# Patient Record
Sex: MALE | Race: WHITE | NOT HISPANIC OR LATINO | Employment: OTHER | URBAN - NONMETROPOLITAN AREA
[De-identification: names, ages, dates, MRNs, and addresses within clinical notes are randomized per-mention and may not be internally consistent; named-entity substitution may affect disease eponyms.]

---

## 2023-10-20 ENCOUNTER — HOSPITAL ENCOUNTER (EMERGENCY)
Facility: HOSPITAL | Age: 88
Discharge: HOME | End: 2023-10-20
Attending: EMERGENCY MEDICINE
Payer: MEDICARE

## 2023-10-20 ENCOUNTER — APPOINTMENT (OUTPATIENT)
Dept: RADIOLOGY | Facility: HOSPITAL | Age: 88
End: 2023-10-20
Payer: MEDICARE

## 2023-10-20 VITALS
OXYGEN SATURATION: 94 % | HEIGHT: 70 IN | HEART RATE: 65 BPM | SYSTOLIC BLOOD PRESSURE: 151 MMHG | BODY MASS INDEX: 32.54 KG/M2 | RESPIRATION RATE: 18 BRPM | WEIGHT: 227.29 LBS | DIASTOLIC BLOOD PRESSURE: 77 MMHG | TEMPERATURE: 97.2 F
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
## 2023-10-20 DIAGNOSIS — S42.114A CLOSED NONDISPLACED FRACTURE OF BODY OF RIGHT SCAPULA, INITIAL ENCOUNTER: ICD-10-CM

## 2023-10-20 DIAGNOSIS — S09.90XA CLOSED HEAD INJURY, INITIAL ENCOUNTER: Primary | ICD-10-CM

## 2023-10-20 PROCEDURE — 72125 CT NECK SPINE W/O DYE: CPT | Performed by: RADIOLOGY

## 2023-10-20 PROCEDURE — 71260 CT THORAX DX C+: CPT | Performed by: RADIOLOGY

## 2023-10-20 PROCEDURE — 2550000001 HC RX 255 CONTRASTS: Performed by: EMERGENCY MEDICINE

## 2023-10-20 PROCEDURE — 72125 CT NECK SPINE W/O DYE: CPT | Mod: MG

## 2023-10-20 PROCEDURE — 70450 CT HEAD/BRAIN W/O DYE: CPT | Mod: MG

## 2023-10-20 PROCEDURE — 70450 CT HEAD/BRAIN W/O DYE: CPT | Performed by: RADIOLOGY

## 2023-10-20 PROCEDURE — 99285 EMERGENCY DEPT VISIT HI MDM: CPT | Mod: 25 | Performed by: EMERGENCY MEDICINE

## 2023-10-20 PROCEDURE — 74177 CT ABD & PELVIS W/CONTRAST: CPT | Performed by: RADIOLOGY

## 2023-10-20 PROCEDURE — 74177 CT ABD & PELVIS W/CONTRAST: CPT | Mod: MG

## 2023-10-20 RX ORDER — FINASTERIDE 5 MG/1
5 TABLET, FILM COATED ORAL DAILY
COMMUNITY

## 2023-10-20 RX ORDER — TAMSULOSIN HYDROCHLORIDE 0.4 MG/1
0.4 CAPSULE ORAL
COMMUNITY

## 2023-10-20 RX ORDER — OMEPRAZOLE 40 MG/1
40 CAPSULE, DELAYED RELEASE ORAL
COMMUNITY

## 2023-10-20 RX ORDER — ESCITALOPRAM OXALATE 20 MG/1
20 TABLET ORAL DAILY
COMMUNITY

## 2023-10-20 RX ORDER — TRIMETHOPRIM 100 MG/1
100 TABLET ORAL DAILY
COMMUNITY

## 2023-10-20 RX ORDER — TRAZODONE HYDROCHLORIDE 50 MG/1
50 TABLET ORAL NIGHTLY
COMMUNITY

## 2023-10-20 RX ORDER — TORSEMIDE 20 MG/1
20 TABLET ORAL DAILY
COMMUNITY

## 2023-10-20 RX ORDER — CALCIUM CARBONATE 300MG(750)
400 TABLET,CHEWABLE ORAL DAILY
COMMUNITY

## 2023-10-20 RX ORDER — FLUTICASONE FUROATE, UMECLIDINIUM BROMIDE AND VILANTEROL TRIFENATATE 100; 62.5; 25 UG/1; UG/1; UG/1
POWDER RESPIRATORY (INHALATION)
COMMUNITY

## 2023-10-20 RX ORDER — MEMANTINE HYDROCHLORIDE 10 MG/1
10 TABLET ORAL 2 TIMES DAILY
COMMUNITY

## 2023-10-20 RX ORDER — CARBIDOPA AND LEVODOPA 25; 250 MG/1; MG/1
1 TABLET ORAL 3 TIMES DAILY
COMMUNITY

## 2023-10-20 RX ORDER — DIVALPROEX SODIUM 250 MG/1
250 TABLET, FILM COATED, EXTENDED RELEASE ORAL 2 TIMES DAILY
COMMUNITY

## 2023-10-20 RX ORDER — EZETIMIBE 10 MG/1
10 TABLET ORAL DAILY
COMMUNITY

## 2023-10-20 RX ORDER — DONEPEZIL HYDROCHLORIDE 10 MG/1
10 TABLET, ORALLY DISINTEGRATING ORAL NIGHTLY
COMMUNITY

## 2023-10-20 RX ADMIN — IOHEXOL 75 ML: 350 INJECTION, SOLUTION INTRAVENOUS at 12:22

## 2023-10-20 ASSESSMENT — PAIN SCALES - PAIN ASSESSMENT IN ADVANCED DEMENTIA (PAINAD)
FACIALEXPRESSION: SAD, FRIGHTENED, FROWN
BODYLANGUAGE: TENSE, DISTRESSED PACING, FIDGETING
CONSOLABILITY: NO NEED TO CONSOLE
BREATHING: NORMAL
NEGVOCALIZATION: OCCASIONAL MOAN/GROAN, LOW SPEECH, NEGATIVE/DISAPPROVING QUALITY
TOTALSCORE: 3

## 2023-10-20 ASSESSMENT — PAIN - FUNCTIONAL ASSESSMENT: PAIN_FUNCTIONAL_ASSESSMENT: PAINAD (PAIN ASSESSMENT IN ADVANCED DEMENTIA SCALE)

## 2023-10-20 ASSESSMENT — COLUMBIA-SUICIDE SEVERITY RATING SCALE - C-SSRS
6. HAVE YOU EVER DONE ANYTHING, STARTED TO DO ANYTHING, OR PREPARED TO DO ANYTHING TO END YOUR LIFE?: NO
1. IN THE PAST MONTH, HAVE YOU WISHED YOU WERE DEAD OR WISHED YOU COULD GO TO SLEEP AND NOT WAKE UP?: NO
2. HAVE YOU ACTUALLY HAD ANY THOUGHTS OF KILLING YOURSELF?: NO

## 2023-10-20 NOTE — ED PROVIDER NOTES
HPI   Chief Complaint   Patient presents with    Fall     [facility] Fell backward into a register. C/O rt rib and shoulder pain. No thinners         History provided by:  Caregiver  History limited by:  Dementia and age                      Deb Coma Scale Score: 14                  Patient History   Past Medical History:   Diagnosis Date    Anxiety     Atherosclerosis     BPH (benign prostatic hyperplasia)     CHF (congestive heart failure) (CMS/HCC)     COPD (chronic obstructive pulmonary disease) (CMS/HCC)     Depression     GERD (gastroesophageal reflux disease)     HTN (hypertension)     Hyperlipidemia     Hypokalemia     Parkinson's disease     UTI (urinary tract infection)      History reviewed. No pertinent surgical history.  No family history on file.  Social History     Tobacco Use    Smoking status: Never    Smokeless tobacco: Never   Substance Use Topics    Alcohol use: Not on file    Drug use: Not on file       Physical Exam   ED Triage Vitals [10/20/23 1132]   Temp Heart Rate Resp BP   36.2 °C (97.2 °F) 62 18 139/52      SpO2 Temp Source Heart Rate Source Patient Position   100 % Temporal -- --      BP Location FiO2 (%)     -- --       Physical Exam  Constitutional:       General: He is not in acute distress.     Appearance: Normal appearance. He is not toxic-appearing.   HENT:      Head: Normocephalic and atraumatic.      Right Ear: Tympanic membrane normal.      Left Ear: Tympanic membrane normal.      Mouth/Throat:      Mouth: Mucous membranes are moist.      Pharynx: Oropharynx is clear.   Eyes:      Conjunctiva/sclera: Conjunctivae normal.      Pupils: Pupils are equal, round, and reactive to light.   Cardiovascular:      Rate and Rhythm: Normal rate and regular rhythm.      Pulses: Normal pulses.      Heart sounds: Normal heart sounds.   Pulmonary:      Effort: Pulmonary effort is normal. No respiratory distress.      Breath sounds: Normal breath sounds. No wheezing.    Abdominal:      General: Bowel sounds are normal.      Palpations: Abdomen is soft.      Tenderness: There is no abdominal tenderness. There is no guarding or rebound.   Musculoskeletal:         General: Tenderness present. Normal range of motion.        Arms:       Cervical back: Normal range of motion.   Skin:     General: Skin is warm and dry.   Neurological:      General: No focal deficit present.      Mental Status: He is oriented to person, place, and time.         ED Course & MDM   ED Course as of 10/20/23 1432   Fri Oct 20, 2023   1135 Discussed with EMS [KA]   1339 Reviewed CT results no emergent findings [KA]      ED Course User Index  [KA] Sukhjinder Lilly DO         Diagnoses as of 10/20/23 1432   Closed head injury, initial encounter   Fall, initial encounter   Closed nondisplaced fracture of body of right scapula, initial encounter       Medical Decision Making  89-year-old gentleman witnessed fall fell backwards hitting some of his back.  Patient came to the ED for evaluation.  Patient has full Imaging no emergent findings found today patient did have some chronic changes.  Family is aware of it.  Patient be discharged back to the care home.  Limited history and physical secondary to dementia.    Reviewed the CT on my own accord and I do believe there is a scapular fracture.  I was able to contact radiology and they were able to confirm my suspicions that patient does have a scapular fracture.  Treatment will be conservative a sling and swath.  And pain control.  Patient will be discharged back to the nursing home with a referral to orthopedics.            Procedure  Procedures     Sukhjinder Lilly DO  10/20/23 1911

## 2024-01-14 ENCOUNTER — HOSPITAL ENCOUNTER (EMERGENCY)
Facility: HOSPITAL | Age: 89
Discharge: HOME | End: 2024-01-14
Attending: EMERGENCY MEDICINE
Payer: MEDICARE

## 2024-01-14 ENCOUNTER — APPOINTMENT (OUTPATIENT)
Dept: RADIOLOGY | Facility: HOSPITAL | Age: 89
End: 2024-01-14
Payer: MEDICARE

## 2024-01-14 ENCOUNTER — APPOINTMENT (OUTPATIENT)
Dept: CARDIOLOGY | Facility: HOSPITAL | Age: 89
End: 2024-01-14
Payer: MEDICARE

## 2024-01-14 VITALS
TEMPERATURE: 97 F | RESPIRATION RATE: 18 BRPM | WEIGHT: 224.87 LBS | SYSTOLIC BLOOD PRESSURE: 115 MMHG | HEIGHT: 68 IN | BODY MASS INDEX: 34.08 KG/M2 | DIASTOLIC BLOOD PRESSURE: 64 MMHG | HEART RATE: 86 BPM | OXYGEN SATURATION: 94 %

## 2024-01-14 DIAGNOSIS — S09.90XA HEAD INJURY, INITIAL ENCOUNTER: ICD-10-CM

## 2024-01-14 DIAGNOSIS — S70.00XA CONTUSION OF HIP, UNSPECIFIED LATERALITY, INITIAL ENCOUNTER: ICD-10-CM

## 2024-01-14 DIAGNOSIS — S01.01XA SCALP LACERATION, INITIAL ENCOUNTER: ICD-10-CM

## 2024-01-14 DIAGNOSIS — S16.1XXA NECK STRAIN, INITIAL ENCOUNTER: ICD-10-CM
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
## 2024-01-14 LAB
ANION GAP SERPL CALC-SCNC: 12 MMOL/L (ref 10–20)
APPEARANCE UR: CLEAR
BASOPHILS # BLD AUTO: 0.04 X10*3/UL (ref 0–0.1)
BASOPHILS NFR BLD AUTO: 0.4 %
BILIRUB UR STRIP.AUTO-MCNC: NEGATIVE MG/DL
BUN SERPL-MCNC: 25 MG/DL (ref 6–23)
CALCIUM SERPL-MCNC: 9 MG/DL (ref 8.6–10.3)
CHLORIDE SERPL-SCNC: 99 MMOL/L (ref 98–107)
CO2 SERPL-SCNC: 32 MMOL/L (ref 21–32)
COLOR UR: YELLOW
CREAT SERPL-MCNC: 1.03 MG/DL (ref 0.5–1.3)
EGFRCR SERPLBLD CKD-EPI 2021: 69 ML/MIN/1.73M*2
EOSINOPHIL # BLD AUTO: 0.36 X10*3/UL (ref 0–0.4)
EOSINOPHIL NFR BLD AUTO: 4 %
ERYTHROCYTE [DISTWIDTH] IN BLOOD BY AUTOMATED COUNT: 11.9 % (ref 11.5–14.5)
GLUCOSE SERPL-MCNC: 101 MG/DL (ref 74–99)
GLUCOSE UR STRIP.AUTO-MCNC: NEGATIVE MG/DL
HCT VFR BLD AUTO: 43 % (ref 41–52)
HGB BLD-MCNC: 14 G/DL (ref 13.5–17.5)
HOLD SPECIMEN: NORMAL
IMM GRANULOCYTES # BLD AUTO: 0.04 X10*3/UL (ref 0–0.5)
IMM GRANULOCYTES NFR BLD AUTO: 0.4 % (ref 0–0.9)
KETONES UR STRIP.AUTO-MCNC: ABNORMAL MG/DL
LEUKOCYTE ESTERASE UR QL STRIP.AUTO: NEGATIVE
LYMPHOCYTES # BLD AUTO: 2.03 X10*3/UL (ref 0.8–3)
LYMPHOCYTES NFR BLD AUTO: 22.7 %
MCH RBC QN AUTO: 32.4 PG (ref 26–34)
MCHC RBC AUTO-ENTMCNC: 32.6 G/DL (ref 32–36)
MCV RBC AUTO: 100 FL (ref 80–100)
MONOCYTES # BLD AUTO: 0.75 X10*3/UL (ref 0.05–0.8)
MONOCYTES NFR BLD AUTO: 8.4 %
NEUTROPHILS # BLD AUTO: 5.71 X10*3/UL (ref 1.6–5.5)
NEUTROPHILS NFR BLD AUTO: 64.1 %
NITRITE UR QL STRIP.AUTO: NEGATIVE
NRBC BLD-RTO: 0 /100 WBCS (ref 0–0)
PH UR STRIP.AUTO: 5 [PH]
PLATELET # BLD AUTO: 175 X10*3/UL (ref 150–450)
POTASSIUM SERPL-SCNC: 4.1 MMOL/L (ref 3.5–5.3)
PROT UR STRIP.AUTO-MCNC: NEGATIVE MG/DL
RBC # BLD AUTO: 4.32 X10*6/UL (ref 4.5–5.9)
RBC # UR STRIP.AUTO: NEGATIVE /UL
SODIUM SERPL-SCNC: 139 MMOL/L (ref 136–145)
SP GR UR STRIP.AUTO: 1.02
UROBILINOGEN UR STRIP.AUTO-MCNC: <2 MG/DL
WBC # BLD AUTO: 8.9 X10*3/UL (ref 4.4–11.3)

## 2024-01-14 PROCEDURE — 70450 CT HEAD/BRAIN W/O DYE: CPT

## 2024-01-14 PROCEDURE — 73700 CT LOWER EXTREMITY W/O DYE: CPT | Mod: LEFT SIDE | Performed by: STUDENT IN AN ORGANIZED HEALTH CARE EDUCATION/TRAINING PROGRAM

## 2024-01-14 PROCEDURE — 76377 3D RENDER W/INTRP POSTPROCES: CPT

## 2024-01-14 PROCEDURE — 73700 CT LOWER EXTREMITY W/O DYE: CPT | Mod: LT

## 2024-01-14 PROCEDURE — 99285 EMERGENCY DEPT VISIT HI MDM: CPT | Performed by: EMERGENCY MEDICINE

## 2024-01-14 PROCEDURE — 72125 CT NECK SPINE W/O DYE: CPT

## 2024-01-14 PROCEDURE — 93005 ELECTROCARDIOGRAM TRACING: CPT

## 2024-01-14 PROCEDURE — 76377 3D RENDER W/INTRP POSTPROCES: CPT | Mod: LEFT SIDE | Performed by: STUDENT IN AN ORGANIZED HEALTH CARE EDUCATION/TRAINING PROGRAM

## 2024-01-14 PROCEDURE — 70450 CT HEAD/BRAIN W/O DYE: CPT | Performed by: STUDENT IN AN ORGANIZED HEALTH CARE EDUCATION/TRAINING PROGRAM

## 2024-01-14 PROCEDURE — 73700 CT LOWER EXTREMITY W/O DYE: CPT | Mod: RT

## 2024-01-14 PROCEDURE — 72125 CT NECK SPINE W/O DYE: CPT | Performed by: STUDENT IN AN ORGANIZED HEALTH CARE EDUCATION/TRAINING PROGRAM

## 2024-01-14 PROCEDURE — 73700 CT LOWER EXTREMITY W/O DYE: CPT | Mod: RIGHT SIDE | Performed by: STUDENT IN AN ORGANIZED HEALTH CARE EDUCATION/TRAINING PROGRAM

## 2024-01-14 PROCEDURE — 76377 3D RENDER W/INTRP POSTPROCES: CPT | Mod: RIGHT SIDE | Performed by: STUDENT IN AN ORGANIZED HEALTH CARE EDUCATION/TRAINING PROGRAM

## 2024-01-14 PROCEDURE — 81003 URINALYSIS AUTO W/O SCOPE: CPT | Performed by: EMERGENCY MEDICINE

## 2024-01-14 PROCEDURE — 85025 COMPLETE CBC W/AUTO DIFF WBC: CPT | Performed by: EMERGENCY MEDICINE

## 2024-01-14 PROCEDURE — 36415 COLL VENOUS BLD VENIPUNCTURE: CPT | Performed by: EMERGENCY MEDICINE

## 2024-01-14 PROCEDURE — 80048 BASIC METABOLIC PNL TOTAL CA: CPT | Performed by: EMERGENCY MEDICINE

## 2024-01-14 RX ORDER — ACETAMINOPHEN 325 MG/1
650 TABLET ORAL ONCE
Status: COMPLETED | OUTPATIENT
Start: 2024-01-14 | End: 2024-01-14

## 2024-01-14 RX ADMIN — ACETAMINOPHEN 650 MG: 325 TABLET ORAL at 04:11

## 2024-01-14 ASSESSMENT — PAIN - FUNCTIONAL ASSESSMENT
PAIN_FUNCTIONAL_ASSESSMENT: 0-10

## 2024-01-14 ASSESSMENT — PAIN DESCRIPTION - LOCATION: LOCATION: HIP

## 2024-01-14 ASSESSMENT — PAIN DESCRIPTION - ONSET: ONSET: SUDDEN

## 2024-01-14 ASSESSMENT — PAIN SCALES - GENERAL
PAINLEVEL_OUTOF10: 0 - NO PAIN
PAINLEVEL_OUTOF10: 2
PAINLEVEL_OUTOF10: 0 - NO PAIN

## 2024-01-14 ASSESSMENT — PAIN SCALES - WONG BAKER: WONGBAKER_NUMERICALRESPONSE: HURTS LITTLE MORE

## 2024-01-14 ASSESSMENT — PAIN DESCRIPTION - PROGRESSION: CLINICAL_PROGRESSION: NOT CHANGED

## 2024-01-14 ASSESSMENT — PAIN DESCRIPTION - FREQUENCY: FREQUENCY: INTERMITTENT

## 2024-01-14 ASSESSMENT — PAIN DESCRIPTION - ORIENTATION: ORIENTATION: LEFT

## 2024-01-14 ASSESSMENT — COLUMBIA-SUICIDE SEVERITY RATING SCALE - C-SSRS
1. IN THE PAST MONTH, HAVE YOU WISHED YOU WERE DEAD OR WISHED YOU COULD GO TO SLEEP AND NOT WAKE UP?: NO
2. HAVE YOU ACTUALLY HAD ANY THOUGHTS OF KILLING YOURSELF?: NO
6. HAVE YOU EVER DONE ANYTHING, STARTED TO DO ANYTHING, OR PREPARED TO DO ANYTHING TO END YOUR LIFE?: NO

## 2024-01-14 NOTE — ED PROVIDER NOTES
HPI   Chief Complaint   Patient presents with    Fall     [place] [facility] [place] right posterior of head. Minimal bleeding.         History provided by:  Medical records, EMS personnel, patient and relative   used: No         This patient presents to the emergency department by ambulance for evaluation after unwitnessed fall at the nursing home.  Patient complaining of pain to the back of his head and the back of his neck as well as both hips.  According to nursing home reports patient has a history of dementia, chronic A-fib, not on any blood thinners.  Family reports patient does have dementia and is confused at times, usually just manifests as slowness in answering questions.  They feel that he has neurologic baseline at this time.  He has had a chronic cough with a history of COPD and just completed a course of steroids and tested negative for COVID.  Patient denies any syncope, chest pain, palpitations to precipitate the fall.  He denies pain in his ribs, upper extremities, back, abdomen.  Tetanus is up-to-date.               Deb Coma Scale Score: 14                  Patient History   Past Medical History:   Diagnosis Date    Anxiety     Atherosclerosis     BPH (benign prostatic hyperplasia)     CHF (congestive heart failure) (CMS/[facility])     COPD (chronic obstructive pulmonary disease) (CMS/[facility])     Depression     GERD (gastroesophageal reflux disease)     HTN (hypertension)     Hyperlipidemia     Hypokalemia     Parkinson's disease     UTI (urinary tract infection)      History reviewed. No pertinent surgical history.  No family history on file.  Social History     Tobacco Use    Smoking status: Unknown    Smokeless tobacco: Not on file   Vaping Use    Vaping Use: Unknown   Substance Use Topics    Alcohol use: Not Currently    Drug use: Never       Physical Exam   ED Triage Vitals [01/14/24 0100]   Temp Heart Rate Resp BP   36.1 °C (97 °F) 102 18 (!) 135/98      SpO2 Temp Source  Heart Rate Source Patient Position   96 % Tympanic -- Lying      BP Location FiO2 (%)     Left arm --       Physical Exam  Vitals reviewed.   Constitutional:       Appearance: Normal appearance.   HENT:      Head: Normocephalic.      Comments: Right occiptalparietal hematoma with dried blood     Nose: Nose normal.      Mouth/Throat:      Mouth: Mucous membranes are moist.   Eyes:      Extraocular Movements: Extraocular movements intact.      Pupils: Pupils are equal, round, and reactive to light.   Cardiovascular:      Rate and Rhythm: Normal rate and regular rhythm.      Pulses: Normal pulses.      Heart sounds: Normal heart sounds.   Pulmonary:      Effort: Pulmonary effort is normal.      Breath sounds: Normal breath sounds.      Comments: Frequent coarse cough  Abdominal:      General: Abdomen is flat.      Palpations: Abdomen is soft.   Musculoskeletal:         General: Normal range of motion.      Cervical back: Normal range of motion and neck supple. Tenderness present.      Comments: Pain both hips, but normal ROM, no deformity or swelling   Skin:     General: Skin is warm and dry.      Capillary Refill: Capillary refill takes less than 2 seconds.      Comments: 2cm irregular y shaped lac right occipital parietal scalp. No bleeding, no FB, well aproximated   Neurological:      General: No focal deficit present.      Mental Status: He is alert. Mental status is at baseline.   Psychiatric:         Mood and Affect: Mood normal.     EKG  0109 --twelve-lead EKG was obtained and read by me. This demonstrates sinus rhythm with PACs and a rate of 96, right bundle branch block, normal axis,, no ischemia, no pericarditis. Compared to most recent prior EKG (9/8/23), PACs are new.    Labs Reviewed   CBC WITH AUTO DIFFERENTIAL - Abnormal       Result Value    WBC 8.9      nRBC 0.0      RBC 4.32 (*)     Hemoglobin 14.0      Hematocrit 43.0            MCH 32.4      MCHC 32.6      RDW 11.9      Platelets 175       Neutrophils % 64.1      Immature Granulocytes %, Automated 0.4      Lymphocytes % 22.7      Monocytes % 8.4      Eosinophils % 4.0      Basophils % 0.4      Neutrophils Absolute 5.71 (*)     Immature Granulocytes Absolute, Automated 0.04      Lymphocytes Absolute 2.03      Monocytes Absolute 0.75      Eosinophils Absolute 0.36      Basophils Absolute 0.04     BASIC METABOLIC PANEL - Abnormal    Glucose 101 (*)     Sodium 139      Potassium 4.1      Chloride 99      Bicarbonate 32      Anion Gap 12      Urea Nitrogen 25 (*)     Creatinine 1.03      eGFR 69      Calcium 9.0     URINALYSIS WITH REFLEX CULTURE AND MICROSCOPIC - Abnormal    Color, Urine Yellow      Appearance, Urine Clear      Specific Gravity, Urine 1.016      pH, Urine 5.0      Protein, Urine NEGATIVE      Glucose, Urine NEGATIVE      Blood, Urine NEGATIVE      Ketones, Urine 5 (TRACE) (*)     Bilirubin, Urine NEGATIVE      Urobilinogen, Urine <2.0      Nitrite, Urine NEGATIVE      Leukocyte Esterase, Urine NEGATIVE     GREEN TOP    Extra Tube Hold for add-ons.     GRAY TOP    Extra Tube Hold for add-ons.     URINALYSIS WITH REFLEX CULTURE AND MICROSCOPIC    Narrative:     The following orders were created for panel order Urinalysis with Reflex Culture and Microscopic.  Procedure                               Abnormality         Status                     ---------                               -----------         ------                     Urinalysis with Reflex C...[[id number]]  Abnormal            Final result               Extra Urine Gray Tube[[id number]]                            In process                   Please view results for these tests on the individual orders.   EXTRA URINE GRAY TUBE     CT head wo IV contrast   Final Result   CT HEAD:   1. No acute intracranial abnormality.   2. Right parietal temporal scalp laceration without underlying skull   fracture.   3. Stable enlargement of pituitary gland with suprasellar extension,   most  likely relating to pituitary macro adenoma.   4. Stable mild burden of supratentorial and infratentorial chronic   small vessel ischemic disease.        CT CERVICAL SPINE:   1. No acute fracture or traumatic malalignment of the cervical spine.   2. Spondylotic changes of the cervical spine as detailed above   notable for high-grade multilevel foraminal stenosis and up to   moderate-severe canal stenosis at C5-C6.        MACRO:   None.        Signed by: Salvatore Cee 1/14/2024 2:51 AM   Dictation workstation:   [id number]      CT cervical spine wo IV contrast   Final Result   CT HEAD:   1. No acute intracranial abnormality.   2. Right parietal temporal scalp laceration without underlying skull   fracture.   3. Stable enlargement of pituitary gland with suprasellar extension,   most likely relating to pituitary macro adenoma.   4. Stable mild burden of supratentorial and infratentorial chronic   small vessel ischemic disease.        CT CERVICAL SPINE:   1. No acute fracture or traumatic malalignment of the cervical spine.   2. Spondylotic changes of the cervical spine as detailed above   notable for high-grade multilevel foraminal stenosis and up to   moderate-severe canal stenosis at C5-C6.        MACRO:   None.        Signed by: Salvatore Cee 1/14/2024 2:51 AM   Dictation workstation:   [id number]      CT hip right wo IV contrast   Final Result   No acute osseous abnormality of the right hip.        Moderate right hip osteoarthrosis and trace joint effusion, similar   to 10/20/2023.        No soft tissue hematoma.        MACRO:   None.        Signed by: Salvatore Cee 1/14/2024 2:39 AM   Dictation workstation:   [id number]      CT hip left wo IV contrast   Final Result   No acute osseous abnormality of the left hip.        Moderate left hip osteoarthrosis and tiny joint effusion similar to   09/08/2023.        No soft tissue hematoma.        MACRO:   None.        Signed by: Salvatore Cee 1/14/2024  2:36 AM   Dictation workstation:   [id number]      CT 3D reconstruction   Final Result   No acute osseous abnormality of the left hip.        Moderate left hip osteoarthrosis and tiny joint effusion similar to   09/08/2023.        No soft tissue hematoma.        MACRO:   None.        Signed by: Salvatore Cee 1/14/2024 2:36 AM   Dictation workstation:   [id number]      CT 3D reconstruction   Final Result   No acute osseous abnormality of the right hip.        Moderate right hip osteoarthrosis and trace joint effusion, similar   to 10/20/2023.        No soft tissue hematoma.        MACRO:   None.        Signed by: Salvatore Cee 1/14/2024 2:39 AM   Dictation workstation:   [id number]        .    ED Course & MDM   ED Course as of 01/14/24 0348   Sun Jan 14, 2024   0253 Results reviewed with patient and family [MN]   0340 Patient reassessed [MN]      ED Course User Index  [MN] Kinjal An MD         Diagnoses as of 01/14/24 0348   Fall, initial encounter   Head injury, initial encounter   Neck strain, initial encounter   Contusion of hip, unspecified laterality, initial encounter   Scalp laceration, initial encounter       Medical Decision Making  Patient presents to the emergency department with the above history and physical.  Given areas of pain and visible injury imaging was obtained of head, cervical spine, hips.  These were read by radiology as chronic changes no acute traumatic injury, 6 scalp hematoma/laceration.  Wound was cleaned by nursing staff and is nicely approximated and will not require any surgical closure.  Nature precautions and wound care instructions provided.    Results of exam and any testing were discussed with patient/family. To the best of my ability, I answered all questions. At this time, there is no indication for admission/transfer or further diagnostic testing. Patient understands to return for any new or worsening symptoms, or failure to improve as anticipated. The  importance of follow-up was stressed.    Procedure  Procedures    Diagnoses as of 01/14/24 0348   Fall, initial encounter   Head injury, initial encounter   Neck strain, initial encounter   Contusion of hip, unspecified laterality, initial encounter   Scalp laceration, initial encounter        Kinjal An MD  01/14/24 8363

## 2024-01-14 NOTE — DISCHARGE INSTRUCTIONS
Protect the wound from dirt and injury.  It is okay to shower.    Keep the wound covered with some Neosporin or bacitracin.    Return to emergency department for dizziness, vomiting, confusion, change in speech, alertness, vision, balance

## 2024-01-15 LAB
ATRIAL RATE: 96 BPM
P AXIS: 38 DEGREES
P OFFSET: 182 MS
P ONSET: 144 MS
PR INTERVAL: 150 MS
Q ONSET: 219 MS
QRS COUNT: 16 BEATS
QRS DURATION: 124 MS
QT INTERVAL: 386 MS
QTC CALCULATION(BAZETT): 487 MS
QTC FREDERICIA: 451 MS
R AXIS: 71 DEGREES
T AXIS: 39 DEGREES
T OFFSET: 412 MS
VENTRICULAR RATE: 96 BPM

## 2024-03-27 ENCOUNTER — APPOINTMENT (OUTPATIENT)
Dept: RADIOLOGY | Facility: HOSPITAL | Age: 89
End: 2024-03-27
Payer: MEDICARE

## 2024-03-27 ENCOUNTER — APPOINTMENT (OUTPATIENT)
Dept: CARDIOLOGY | Facility: HOSPITAL | Age: 89
End: 2024-03-27
Payer: MEDICARE

## 2024-03-27 ENCOUNTER — HOSPITAL ENCOUNTER (EMERGENCY)
Facility: HOSPITAL | Age: 89
Discharge: NURSING, REHAB OR LONG-TERM CARE | End: 2024-03-27
Attending: EMERGENCY MEDICINE
Payer: MEDICARE

## 2024-03-27 VITALS
SYSTOLIC BLOOD PRESSURE: 118 MMHG | WEIGHT: 220.46 LBS | RESPIRATION RATE: 18 BRPM | HEIGHT: 70 IN | HEART RATE: 72 BPM | OXYGEN SATURATION: 94 % | TEMPERATURE: 97.7 F | DIASTOLIC BLOOD PRESSURE: 58 MMHG | BODY MASS INDEX: 31.56 KG/M2

## 2024-03-27 DIAGNOSIS — N30.00 ACUTE CYSTITIS WITHOUT HEMATURIA: ICD-10-CM

## 2024-03-27 DIAGNOSIS — S09.90XA CLOSED HEAD INJURY, INITIAL ENCOUNTER: ICD-10-CM

## 2024-03-27 DIAGNOSIS — T14.8XXA ABRASION: ICD-10-CM
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
## 2024-03-27 LAB
ANION GAP SERPL CALC-SCNC: 12 MMOL/L (ref 10–20)
APPEARANCE UR: ABNORMAL
APPEARANCE UR: ABNORMAL
ATRIAL RATE: 64 BPM
BACTERIA #/AREA URNS AUTO: ABNORMAL /HPF
BACTERIA #/AREA URNS AUTO: ABNORMAL /HPF
BASOPHILS # BLD AUTO: 0.03 X10*3/UL (ref 0–0.1)
BASOPHILS NFR BLD AUTO: 0.6 %
BILIRUB UR STRIP.AUTO-MCNC: NEGATIVE MG/DL
BILIRUB UR STRIP.AUTO-MCNC: NEGATIVE MG/DL
BUN SERPL-MCNC: 22 MG/DL (ref 6–23)
CALCIUM SERPL-MCNC: 8.5 MG/DL (ref 8.6–10.3)
CARDIAC TROPONIN I PNL SERPL HS: 6 NG/L (ref 0–20)
CHLORIDE SERPL-SCNC: 104 MMOL/L (ref 98–107)
CO2 SERPL-SCNC: 33 MMOL/L (ref 21–32)
COLOR UR: YELLOW
COLOR UR: YELLOW
CREAT SERPL-MCNC: 1 MG/DL (ref 0.5–1.3)
EGFRCR SERPLBLD CKD-EPI 2021: 71 ML/MIN/1.73M*2
EOSINOPHIL # BLD AUTO: 0.2 X10*3/UL (ref 0–0.4)
EOSINOPHIL NFR BLD AUTO: 3.8 %
ERYTHROCYTE [DISTWIDTH] IN BLOOD BY AUTOMATED COUNT: 12.4 % (ref 11.5–14.5)
GLUCOSE SERPL-MCNC: 100 MG/DL (ref 74–99)
GLUCOSE UR STRIP.AUTO-MCNC: NEGATIVE MG/DL
GLUCOSE UR STRIP.AUTO-MCNC: NEGATIVE MG/DL
HCT VFR BLD AUTO: 38.4 % (ref 41–52)
HGB BLD-MCNC: 12.1 G/DL (ref 13.5–17.5)
HOLD SPECIMEN: NORMAL
IMM GRANULOCYTES # BLD AUTO: 0.03 X10*3/UL (ref 0–0.5)
IMM GRANULOCYTES NFR BLD AUTO: 0.6 % (ref 0–0.9)
KETONES UR STRIP.AUTO-MCNC: NEGATIVE MG/DL
KETONES UR STRIP.AUTO-MCNC: NEGATIVE MG/DL
LEUKOCYTE ESTERASE UR QL STRIP.AUTO: ABNORMAL
LEUKOCYTE ESTERASE UR QL STRIP.AUTO: ABNORMAL
LYMPHOCYTES # BLD AUTO: 1.8 X10*3/UL (ref 0.8–3)
LYMPHOCYTES NFR BLD AUTO: 33.9 %
MCH RBC QN AUTO: 31.6 PG (ref 26–34)
MCHC RBC AUTO-ENTMCNC: 31.5 G/DL (ref 32–36)
MCV RBC AUTO: 100 FL (ref 80–100)
MONOCYTES # BLD AUTO: 0.41 X10*3/UL (ref 0.05–0.8)
MONOCYTES NFR BLD AUTO: 7.7 %
NEUTROPHILS # BLD AUTO: 2.84 X10*3/UL (ref 1.6–5.5)
NEUTROPHILS NFR BLD AUTO: 53.4 %
NITRITE UR QL STRIP.AUTO: NEGATIVE
NITRITE UR QL STRIP.AUTO: NEGATIVE
NRBC BLD-RTO: 0 /100 WBCS (ref 0–0)
P AXIS: 70 DEGREES
P OFFSET: 159 MS
P ONSET: 132 MS
PH UR STRIP.AUTO: 6 [PH]
PH UR STRIP.AUTO: 6 [PH]
PLATELET # BLD AUTO: 182 X10*3/UL (ref 150–450)
POTASSIUM SERPL-SCNC: 3.6 MMOL/L (ref 3.5–5.3)
PR INTERVAL: 174 MS
PROT UR STRIP.AUTO-MCNC: NEGATIVE MG/DL
PROT UR STRIP.AUTO-MCNC: NEGATIVE MG/DL
Q ONSET: 219 MS
QRS COUNT: 10 BEATS
QRS DURATION: 140 MS
QT INTERVAL: 460 MS
QTC CALCULATION(BAZETT): 474 MS
QTC FREDERICIA: 469 MS
R AXIS: 51 DEGREES
RBC # BLD AUTO: 3.83 X10*6/UL (ref 4.5–5.9)
RBC # UR STRIP.AUTO: NEGATIVE /UL
RBC # UR STRIP.AUTO: NEGATIVE /UL
RBC #/AREA URNS AUTO: ABNORMAL /HPF
RBC #/AREA URNS AUTO: ABNORMAL /HPF
SODIUM SERPL-SCNC: 145 MMOL/L (ref 136–145)
SP GR UR STRIP.AUTO: 1.02
SP GR UR STRIP.AUTO: 1.02
SQUAMOUS #/AREA URNS AUTO: ABNORMAL /HPF
SQUAMOUS #/AREA URNS AUTO: ABNORMAL /HPF
T AXIS: 33 DEGREES
T OFFSET: 449 MS
UROBILINOGEN UR STRIP.AUTO-MCNC: <2 MG/DL
UROBILINOGEN UR STRIP.AUTO-MCNC: <2 MG/DL
VENTRICULAR RATE: 64 BPM
WBC # BLD AUTO: 5.3 X10*3/UL (ref 4.4–11.3)
WBC #/AREA URNS AUTO: ABNORMAL /HPF
WBC #/AREA URNS AUTO: ABNORMAL /HPF
WBC CLUMPS #/AREA URNS AUTO: ABNORMAL /HPF

## 2024-03-27 PROCEDURE — 93005 ELECTROCARDIOGRAM TRACING: CPT

## 2024-03-27 PROCEDURE — 2500000001 HC RX 250 WO HCPCS SELF ADMINISTERED DRUGS (ALT 637 FOR MEDICARE OP): Performed by: EMERGENCY MEDICINE

## 2024-03-27 PROCEDURE — 2500000002 HC RX 250 W HCPCS SELF ADMINISTERED DRUGS (ALT 637 FOR MEDICARE OP, ALT 636 FOR OP/ED): Performed by: EMERGENCY MEDICINE

## 2024-03-27 PROCEDURE — 81001 URINALYSIS AUTO W/SCOPE: CPT | Performed by: EMERGENCY MEDICINE

## 2024-03-27 PROCEDURE — 71045 X-RAY EXAM CHEST 1 VIEW: CPT

## 2024-03-27 PROCEDURE — 90471 IMMUNIZATION ADMIN: CPT | Performed by: EMERGENCY MEDICINE

## 2024-03-27 PROCEDURE — 70450 CT HEAD/BRAIN W/O DYE: CPT

## 2024-03-27 PROCEDURE — 71045 X-RAY EXAM CHEST 1 VIEW: CPT | Performed by: RADIOLOGY

## 2024-03-27 PROCEDURE — 90715 TDAP VACCINE 7 YRS/> IM: CPT | Performed by: EMERGENCY MEDICINE

## 2024-03-27 PROCEDURE — 87086 URINE CULTURE/COLONY COUNT: CPT | Mod: CONLAB | Performed by: EMERGENCY MEDICINE

## 2024-03-27 PROCEDURE — 85025 COMPLETE CBC W/AUTO DIFF WBC: CPT | Performed by: EMERGENCY MEDICINE

## 2024-03-27 PROCEDURE — 2500000004 HC RX 250 GENERAL PHARMACY W/ HCPCS (ALT 636 FOR OP/ED): Performed by: EMERGENCY MEDICINE

## 2024-03-27 PROCEDURE — 72125 CT NECK SPINE W/O DYE: CPT | Performed by: RADIOLOGY

## 2024-03-27 PROCEDURE — 80048 BASIC METABOLIC PNL TOTAL CA: CPT | Performed by: EMERGENCY MEDICINE

## 2024-03-27 PROCEDURE — 84484 ASSAY OF TROPONIN QUANT: CPT | Performed by: EMERGENCY MEDICINE

## 2024-03-27 PROCEDURE — 99285 EMERGENCY DEPT VISIT HI MDM: CPT | Mod: 25

## 2024-03-27 PROCEDURE — 36415 COLL VENOUS BLD VENIPUNCTURE: CPT | Performed by: EMERGENCY MEDICINE

## 2024-03-27 PROCEDURE — 72125 CT NECK SPINE W/O DYE: CPT

## 2024-03-27 PROCEDURE — 70450 CT HEAD/BRAIN W/O DYE: CPT | Performed by: RADIOLOGY

## 2024-03-27 RX ORDER — NITROFURANTOIN 25; 75 MG/1; MG/1
100 CAPSULE ORAL 2 TIMES DAILY
Qty: 14 CAPSULE | Refills: 0 | Status: SHIPPED | OUTPATIENT
Start: 2024-03-27 | End: 2024-04-03

## 2024-03-27 RX ORDER — BENZONATATE 100 MG/1
200 CAPSULE ORAL ONCE
Status: COMPLETED | OUTPATIENT
Start: 2024-03-27 | End: 2024-03-27

## 2024-03-27 RX ORDER — BACITRACIN 500 [USP'U]/G
OINTMENT TOPICAL
Status: DISCONTINUED
Start: 2024-03-27 | End: 2024-03-27 | Stop reason: HOSPADM

## 2024-03-27 RX ORDER — ACETAMINOPHEN 325 MG/1
650 TABLET ORAL ONCE
Status: COMPLETED | OUTPATIENT
Start: 2024-03-27 | End: 2024-03-27

## 2024-03-27 RX ORDER — BACITRACIN ZINC 500 UNIT/G
1 OINTMENT IN PACKET (EA) TOPICAL ONCE
Status: DISCONTINUED | OUTPATIENT
Start: 2024-03-27 | End: 2024-03-27 | Stop reason: HOSPADM

## 2024-03-27 RX ORDER — NITROFURANTOIN 25; 75 MG/1; MG/1
100 CAPSULE ORAL ONCE
Status: COMPLETED | OUTPATIENT
Start: 2024-03-27 | End: 2024-03-27

## 2024-03-27 RX ADMIN — TETANUS TOXOID, REDUCED DIPHTHERIA TOXOID AND ACELLULAR PERTUSSIS VACCINE, ADSORBED 0.5 ML: 5; 2.5; 8; 8; 2.5 SUSPENSION INTRAMUSCULAR at 03:03

## 2024-03-27 RX ADMIN — ACETAMINOPHEN 650 MG: 325 TABLET ORAL at 03:02

## 2024-03-27 RX ADMIN — NITROFURANTOIN MONOHYDRATE/MACROCRYSTALS 100 MG: 25; 75 CAPSULE ORAL at 07:45

## 2024-03-27 RX ADMIN — BENZONATATE 200 MG: 100 CAPSULE ORAL at 03:37

## 2024-03-27 ASSESSMENT — PAIN SCALES - GENERAL
PAINLEVEL_OUTOF10: 6
PAINLEVEL_OUTOF10: 3
PAINLEVEL_OUTOF10: 3

## 2024-03-27 ASSESSMENT — PAIN DESCRIPTION - PAIN TYPE: TYPE: ACUTE PAIN

## 2024-03-27 ASSESSMENT — COLUMBIA-SUICIDE SEVERITY RATING SCALE - C-SSRS
2. HAVE YOU ACTUALLY HAD ANY THOUGHTS OF KILLING YOURSELF?: NO
1. IN THE PAST MONTH, HAVE YOU WISHED YOU WERE DEAD OR WISHED YOU COULD GO TO SLEEP AND NOT WAKE UP?: NO
6. HAVE YOU EVER DONE ANYTHING, STARTED TO DO ANYTHING, OR PREPARED TO DO ANYTHING TO END YOUR LIFE?: NO

## 2024-03-27 ASSESSMENT — PAIN - FUNCTIONAL ASSESSMENT: PAIN_FUNCTIONAL_ASSESSMENT: 0-10

## 2024-03-27 NOTE — DISCHARGE INSTRUCTIONS
Culture has been obtained today. This may take up to 3 days to be finalized. We can call you, if a change in treatment is needed.    Return for dizziness, vomiting, change in mental status, fever, vomiting, abdominal pain, increasing redness to wound or drainage from wound.    Protect the wound from dirt and injury.  Clean it daily with gentle soap and water.  Keep bacitracin ointment on the area

## 2024-03-27 NOTE — ED PROVIDER NOTES
"HPI   Chief Complaint   Patient presents with    Fall     Pt brought from care facility with c/o fall x 2. Pt brought by ambulance. Per care facility pt has history of dementia and baseline confused. Pt presents with c-collar by EMS and abrasion noted to head. Unknown loss of consciousness.         History provided by:  Patient, EMS personnel and medical records   used: No         This patient presents to the emergency department via ambulance for evaluation after falling at the nursing home.  According to EMS they had been called out for lift assist because the patient had fallen during a pivot transfer.  They assisted the patient back to his chair and return to station and were immediately toned out because he had fallen again.  They state that they were told the patient had hit his head on a dresser.  Patient is not on any blood thinners.  He has a history of dementia and is at his neurologic baseline from mental status standpoint.  EMS does say staff felt it was unusual for him to fall.    Patient is complaining of pain only to his forehead.  He denies any neck or back pain.  He says he has been coughing \"for quite a while\".  He denies any chest pain or shortness of breath.  No nausea or vomiting.  He denies any pain to his extremities, back, pelvis.  He is uncertain as to his tetanus status.    On review of the electronic medical record patient has a history of hypertension, congestive heart failure, dementia, Parkinson's disease.               Deb Coma Scale Score: 14                     Patient History   Past Medical History:   Diagnosis Date    Anxiety     Atherosclerosis     BPH (benign prostatic hyperplasia)     CHF (congestive heart failure) (CMS/[facility])     COPD (chronic obstructive pulmonary disease) (CMS/[facility])     Depression     GERD (gastroesophageal reflux disease)     HTN (hypertension)     Hyperlipidemia     Hypokalemia     Parkinson's disease     UTI (urinary tract infection)  "     History reviewed. No pertinent surgical history.  No family history on file.  Social History     Tobacco Use    Smoking status: Unknown    Smokeless tobacco: Not on file   Vaping Use    Vaping Use: Unknown   Substance Use Topics    Alcohol use: Not Currently    Drug use: Never       Physical Exam   ED Triage Vitals [03/27/24 0250]   Temperature Heart Rate Respirations BP   36.6 °C (97.9 °F) 69 18 137/69      SpO2 Temp Source Heart Rate Source Patient Position   93 % Temporal Monitor Lying      BP Location FiO2 (%)     Right arm --       Physical Exam  Vitals reviewed.   Constitutional:       Appearance: Normal appearance.   HENT:      Head: Normocephalic.      Comments: Superficial forehead abrasion/rug burn appearing injury.  No hematoma.  No repairable laceration.     Right Ear: Tympanic membrane normal.      Left Ear: Tympanic membrane normal.      Ears:      Comments: No hemotympanum.  No raccoon's eyes.  No Perez sign     Nose: Nose normal.      Mouth/Throat:      Mouth: Mucous membranes are moist.      Pharynx: No oropharyngeal exudate.   Eyes:      General:         Right eye: No discharge.      Extraocular Movements: Extraocular movements intact.      Conjunctiva/sclera: Conjunctivae normal.      Pupils: Pupils are equal, round, and reactive to light.   Cardiovascular:      Rate and Rhythm: Normal rate and regular rhythm.      Pulses: Normal pulses.      Heart sounds: Normal heart sounds.   Pulmonary:      Effort: Pulmonary effort is normal.      Breath sounds: Normal breath sounds.      Comments: Frequent, dry sounding cough.  Abdominal:      General: Abdomen is flat. Bowel sounds are normal.      Palpations: Abdomen is soft.      Tenderness: There is no abdominal tenderness.      Comments: No evidence of trauma   Musculoskeletal:         General: Normal range of motion.      Cervical back: Normal range of motion and neck supple. No rigidity or tenderness.      Comments: No midline cervical, thoracic,  lumbar spine tenderness.  Pelvis nontender and stable.  Normal range of motion all extremities. Skin tear, contusion left elbow without suturable wound, deformity, bony tenderness.   Skin:     General: Skin is warm and dry.      Capillary Refill: Capillary refill takes less than 2 seconds.      Findings: No rash.   Neurological:      Mental Status: He is alert. Mental status is at baseline.      Cranial Nerves: No cranial nerve deficit.      Sensory: No sensory deficit.      Motor: No weakness.      Comments: According to EMS the patient is not ambulatory at baseline but does stand pivots for transfer.   Psychiatric:         Mood and Affect: Mood normal.         Behavior: Behavior normal.     EKG  0311 --twelve-lead EKG was obtained and read by me. This demonstrates normal sinus rhythm with premature supraventricular complexes at a rate of 64, right bundle branch block pattern, but, no ischemia, no pericarditis. There was no change compared to most recent prior EKG. 1/14/24    Labs Reviewed   CBC WITH AUTO DIFFERENTIAL - Abnormal       Result Value    WBC 5.3      nRBC 0.0      RBC 3.83 (*)     Hemoglobin 12.1 (*)     Hematocrit 38.4 (*)           MCH 31.6      MCHC 31.5 (*)     RDW 12.4      Platelets 182      Neutrophils % 53.4      Immature Granulocytes %, Automated 0.6      Lymphocytes % 33.9      Monocytes % 7.7      Eosinophils % 3.8      Basophils % 0.6      Neutrophils Absolute 2.84      Immature Granulocytes Absolute, Automated 0.03      Lymphocytes Absolute 1.80      Monocytes Absolute 0.41      Eosinophils Absolute 0.20      Basophils Absolute 0.03     BASIC METABOLIC PANEL - Abnormal    Glucose 100 (*)     Sodium 145      Potassium 3.6      Chloride 104      Bicarbonate 33 (*)     Anion Gap 12      Urea Nitrogen 22      Creatinine 1.00      eGFR 71      Calcium 8.5 (*)    URINALYSIS WITH REFLEX CULTURE AND MICROSCOPIC - Abnormal    Color, Urine Yellow      Appearance, Urine Hazy (*)     Specific  Gravity, Urine 1.017      pH, Urine 6.0      Protein, Urine NEGATIVE      Glucose, Urine NEGATIVE      Blood, Urine NEGATIVE      Ketones, Urine NEGATIVE      Bilirubin, Urine NEGATIVE      Urobilinogen, Urine <2.0      Nitrite, Urine NEGATIVE      Leukocyte Esterase, Urine LARGE (3+) (*)    MICROSCOPIC ONLY, URINE - Abnormal    WBC, Urine 21-50 (*)     WBC Clumps, Urine RARE      RBC, Urine NONE      Squamous Epithelial Cells, Urine 1-9 (SPARSE)      Bacteria, Urine 1+ (*)    TROPONIN I, HIGH SENSITIVITY - Normal    Troponin I, High Sensitivity 6      Narrative:     Less than 99th percentile of normal range cutoff-  Female and children under 18 years old <14 ng/L; Male <21 ng/L: Negative  Repeat testing should be performed if clinically indicated.     Female and children under 18 years old 14-50 ng/L; Male 21-50 ng/L:  Consistent with possible cardiac damage and possible increased clinical   risk. Serial measurements may help to assess extent of myocardial damage.     >50 ng/L: Consistent with cardiac damage, increased clinical risk and  myocardial infarction. Serial measurements may help assess extent of   myocardial damage.      NOTE: Children less than 1 year old may have higher baseline troponin   levels and results should be interpreted in conjunction with the overall   clinical context.     NOTE: Troponin I testing is performed using a different   testing methodology at [facility] [place] than at other   [facility]. Direct result comparisons should only   be made within the same method.   URINE CULTURE   GRAY TOP    Extra Tube Hold for add-ons.     URINALYSIS WITH REFLEX CULTURE AND MICROSCOPIC    Narrative:     The following orders were created for panel order Urinalysis with Reflex Culture and Microscopic.  Procedure                               Abnormality         Status                     ---------                               -----------         ------                     Urinalysis with  Reflex C...[[id number]]  Abnormal            Final result               Extra Urine Gray Tube[[id number]]                            In process                   Please view results for these tests on the individual orders.   EXTRA URINE GRAY TUBE   URINALYSIS WITH REFLEX CULTURE AND MICROSCOPIC    Narrative:     The following orders were created for panel order Urinalysis with Reflex Culture and Microscopic.  Procedure                               Abnormality         Status                     ---------                               -----------         ------                     Urinalysis with Reflex C...[[id number]]                                                 Extra Urine Gray Tube[[id number]]                                                         Please view results for these tests on the individual orders.   URINALYSIS WITH REFLEX CULTURE AND MICROSCOPIC   EXTRA URINE GRAY TUBE     ED Medication Administration from 03/27/2024 0236 to 03/27/2024 0423         Date/Time Order Dose Route Action Action by     03/27/2024 0302 EDT acetaminophen (Tylenol) tablet 650 mg 650 mg oral Given DENICE Hernandez     03/27/2024 0303 EDT diphth,pertus(acell),tetanus (BoostRIX) 2.5-8-5 Lf-mcg-Lf/0.5mL vaccine 0.5 mL 0.5 mL intramuscular Given DENICE Hernandez     03/27/2024 0337 EDT benzonatate (Tessalon) capsule 200 mg 200 mg oral Given JOHN Douglas          CT head wo IV contrast   Final Result   No findings of acute intracranial hemorrhage. Global volume loss and   chronic small vessel ischemic change again seen.        Sellar mass with imaging characteristics of adenoma not significantly   changed.        No findings of acute cervical spine fracture. Degenerative changes.        Signed by: Dominic Abbasi 3/27/2024 5:54 AM   Dictation workstation:   [id number]      CT cervical spine wo IV contrast   Final Result   No findings of acute intracranial hemorrhage. Global volume loss and   chronic small vessel ischemic change again seen.       "  Sellar mass with imaging characteristics of adenoma not significantly   changed.        No findings of acute cervical spine fracture. Degenerative changes.        Signed by: Dominic Abbasi 3/27/2024 5:54 AM   Dictation workstation:   [id number]      XR chest 1 view   Final Result   1.  Stable examination. No acute cardiopulmonary process                  MACRO:   None        Signed by: Dominic Abbasi 3/27/2024 5:55 AM   Dictation workstation:   [id number]            ED Course & MDM   ED Course as of 03/27/24 0654   Wed Mar 27, 2024   0320 Patient reassessed [MN]   0413 Patient reassessed, coughing less [MN]   0601 Radiology results reviewed, C Collar discontinued [MN]   0646 WBC, Urine(!): 21-50 [MN]      ED Course User Index  [MN] Kinjal An MD         Diagnoses as of 03/27/24 0654   Fall, initial encounter   Closed head injury, initial encounter   Abrasion   Acute cystitis without hematuria       Medical Decision Making  This patient presents to the emergency department after falling twice in a short period of time at the nursing home.  There was no reported syncope or loss of consciousness, although patient did hit his head.  He is not on any blood thinners.  He is superficial abrasion/rug burn to his forehead but no wounds require surgical closure.  CT imaging of brain and cervical spine were obtained due to the history of trauma.  These were read by radiology.  Unchanged cerebral atrophy and suprasellar density no evidence of acute cranial or cervical spine trauma.  Chest x-ray no acute cardiopulmonary disease.    Additionally patient complains of a \"cough for a long time\".  He does have history of COPD, but is not actively wheezing.  He is oxygenating well.  He is afebrile.  Chest x-ray obtained to evaluate for any signs of infection.  Patient given Tylenol for his head pain and Tessalon for his coughing.  Tetanus was updated.    Laboratory evaluation normal white count chemistries normal " except for a bicarb of 33 and calcium of 8.5.  Patient has mild anemia with hemoglobin of 12.5.  This is not greatly out of range from previous values as far back as 2022. Troponin is normal. EKG shows no hemodynamically significant arrhythmia or ischemia.    Straight cath was obtained for urinalysis and does not demonstrate significant pyuria.  Review of the patient's last urine culture was showing Enterococcus faecalis resistant to fluoroquinolones; therefore Macrobid prescribed based on the sensitivities with first dose given in the emergency department.    Patient will be discharged back to nursing home.  Head injury precautions provided.    Results of exam and any testing were discussed with patient/family. To the best of my ability, I answered all questions. At this time, there is no indication for admission/transfer or further diagnostic testing. Patient understands to return for any new or worsening symptoms, or failure to improve as anticipated. The importance of follow-up was stressed.    Procedure  Procedures    Diagnoses as of 03/27/24 0654   Fall, initial encounter   Closed head injury, initial encounter   Abrasion   Acute cystitis without hematuria        Kinjal An MD  03/27/24 0655       Kinjal An MD  03/27/24 0700

## 2024-03-30 LAB — BACTERIA UR CULT: ABNORMAL

## 2024-03-31 ENCOUNTER — TELEPHONE (OUTPATIENT)
Dept: PHARMACY | Facility: HOSPITAL | Age: 89
End: 2024-03-31
Payer: MEDICARE

## 2024-03-31 NOTE — PROGRESS NOTES
EDPD Note: Lab/Chart Reviewed    Reviewed Mr. Manny Stearns 's chart regarding a positive Urine culture that was taken during their recent emergency room visit. The patient was transferred back to their rehab/LTC facility .Therefore, I have faxed this information to Leo at the [facility] at fax number [phone] .    Susceptibility data from last 90 days.  Collected Specimen Info Organism Ampicillin Ciprofloxacin Levofloxacin Nitrofurantoin Penicillin Trimethoprim/Sulfamethoxazole Vancomycin   03/27/24 Urine from Clean Catch/Voided Enterococcus faecalis S S S S S R S       No further follow up needed from EDPD Team.     Urbano Johnston, PharmD

## 2024-03-31 NOTE — PROGRESS NOTES
EDPD Note: Lab/Chart Reviewed    Reviewed Mr. Manny Stearns 's chart regarding a positive Urine culture that was taken during their recent emergency room visit. The patient was transferred back to their rehab/LTC facility .Therefore, I have faxed this information to Leo at the [facility] at fax number [phone] .    Susceptibility data from last 90 days.  Collected Specimen Info Organism Ampicillin Ciprofloxacin Levofloxacin Nitrofurantoin Penicillin Trimethoprim/Sulfamethoxazole Vancomycin   03/27/24 Urine from Clean Catch/Voided Enterococcus faecalis S S S S S R S       No further follow up needed from EDPD Team.     Urbano Johnston, PharmD